# Patient Record
Sex: MALE | Race: WHITE | NOT HISPANIC OR LATINO | Employment: FULL TIME | URBAN - METROPOLITAN AREA
[De-identification: names, ages, dates, MRNs, and addresses within clinical notes are randomized per-mention and may not be internally consistent; named-entity substitution may affect disease eponyms.]

---

## 2024-04-12 ENCOUNTER — HOSPITAL ENCOUNTER (EMERGENCY)
Facility: CLINIC | Age: 39
Discharge: HOME | End: 2024-04-12
Attending: PHYSICIAN ASSISTANT | Admitting: PHYSICIAN ASSISTANT
Payer: COMMERCIAL

## 2024-04-12 ENCOUNTER — OFFICE VISIT (OUTPATIENT)
Dept: URGENT CARE | Facility: URGENT CARE | Age: 39
End: 2024-04-12
Payer: COMMERCIAL

## 2024-04-12 VITALS
HEIGHT: 72 IN | OXYGEN SATURATION: 100 % | WEIGHT: 162.92 LBS | TEMPERATURE: 97.3 F | DIASTOLIC BLOOD PRESSURE: 87 MMHG | BODY MASS INDEX: 22.07 KG/M2 | HEART RATE: 65 BPM | SYSTOLIC BLOOD PRESSURE: 137 MMHG | RESPIRATION RATE: 16 BRPM

## 2024-04-12 VITALS
DIASTOLIC BLOOD PRESSURE: 76 MMHG | RESPIRATION RATE: 18 BRPM | HEART RATE: 59 BPM | SYSTOLIC BLOOD PRESSURE: 121 MMHG | TEMPERATURE: 97.4 F | OXYGEN SATURATION: 99 %

## 2024-04-12 DIAGNOSIS — S61.310A LACERATION OF RIGHT INDEX FINGER W/O FOREIGN BODY WITH DAMAGE TO NAIL, INITIAL ENCOUNTER: Primary | ICD-10-CM

## 2024-04-12 DIAGNOSIS — S61.210A LACERATION OF RIGHT INDEX FINGER WITHOUT FOREIGN BODY WITHOUT DAMAGE TO NAIL, INITIAL ENCOUNTER: ICD-10-CM

## 2024-04-12 DIAGNOSIS — S05.01XA ABRASION OF RIGHT CORNEA, INITIAL ENCOUNTER: ICD-10-CM

## 2024-04-12 PROCEDURE — 99207 PR NO CHARGE LOS: CPT | Performed by: FAMILY MEDICINE

## 2024-04-12 PROCEDURE — 12001 RPR S/N/AX/GEN/TRNK 2.5CM/<: CPT

## 2024-04-12 PROCEDURE — 99283 EMERGENCY DEPT VISIT LOW MDM: CPT | Mod: 25

## 2024-04-12 PROCEDURE — 250N000009 HC RX 250

## 2024-04-12 PROCEDURE — 90715 TDAP VACCINE 7 YRS/> IM: CPT | Performed by: PHYSICIAN ASSISTANT

## 2024-04-12 PROCEDURE — 90471 IMMUNIZATION ADMIN: CPT | Performed by: PHYSICIAN ASSISTANT

## 2024-04-12 PROCEDURE — 250N000011 HC RX IP 250 OP 636: Performed by: PHYSICIAN ASSISTANT

## 2024-04-12 RX ORDER — TETRACAINE HYDROCHLORIDE 5 MG/ML
SOLUTION OPHTHALMIC
Status: COMPLETED
Start: 2024-04-12 | End: 2024-04-12

## 2024-04-12 RX ORDER — DULOXETIN HYDROCHLORIDE 30 MG/1
30 CAPSULE, DELAYED RELEASE ORAL DAILY
COMMUNITY
Start: 2024-02-15

## 2024-04-12 RX ORDER — BUPROPION HYDROCHLORIDE 150 MG/1
150 TABLET ORAL EVERY MORNING
COMMUNITY
Start: 2024-02-15

## 2024-04-12 RX ORDER — ERYTHROMYCIN 5 MG/G
0.5 OINTMENT OPHTHALMIC 4 TIMES DAILY
Qty: 1 G | Refills: 0 | Status: SHIPPED | OUTPATIENT
Start: 2024-04-12 | End: 2024-04-17

## 2024-04-12 RX ADMIN — CLOSTRIDIUM TETANI TOXOID ANTIGEN (FORMALDEHYDE INACTIVATED), CORYNEBACTERIUM DIPHTHERIAE TOXOID ANTIGEN (FORMALDEHYDE INACTIVATED), BORDETELLA PERTUSSIS TOXOID ANTIGEN (GLUTARALDEHYDE INACTIVATED), BORDETELLA PERTUSSIS FILAMENTOUS HEMAGGLUTININ ANTIGEN (FORMALDEHYDE INACTIVATED), BORDETELLA PERTUSSIS PERTACTIN ANTIGEN, AND BORDETELLA PERTUSSIS FIMBRIAE 2/3 ANTIGEN 0.5 ML: 5; 2; 2.5; 5; 3; 5 INJECTION, SUSPENSION INTRAMUSCULAR at 16:00

## 2024-04-12 RX ADMIN — FLUORESCEIN SODIUM: 1 STRIP OPHTHALMIC at 16:04

## 2024-04-12 RX ADMIN — TETRACAINE HYDROCHLORIDE: 5 SOLUTION OPHTHALMIC at 16:05

## 2024-04-12 ASSESSMENT — VISUAL ACUITY
OU: 20/15;WITHOUT CORRECTIVE LENSES
OS: WITHOUT CORRECTIVE LENSES;20/20
OU: 1
OD: 20/15;WITHOUT CORRECTIVE LENSES

## 2024-04-12 ASSESSMENT — COLUMBIA-SUICIDE SEVERITY RATING SCALE - C-SSRS
1. IN THE PAST MONTH, HAVE YOU WISHED YOU WERE DEAD OR WISHED YOU COULD GO TO SLEEP AND NOT WAKE UP?: NO
2. HAVE YOU ACTUALLY HAD ANY THOUGHTS OF KILLING YOURSELF IN THE PAST MONTH?: NO
6. HAVE YOU EVER DONE ANYTHING, STARTED TO DO ANYTHING, OR PREPARED TO DO ANYTHING TO END YOUR LIFE?: NO

## 2024-04-12 ASSESSMENT — ACTIVITIES OF DAILY LIVING (ADL): ADLS_ACUITY_SCORE: 35

## 2024-04-12 NOTE — PROGRESS NOTES
THIS IS A TRIAGE ENCOUNTER     Nils Miller is a 38 year old right-handed male who presents to the clinic with a laceration on the volar surface of the DIP joint region of the right index finger sustained today about 30 minutes ago.  This is a non work-related injury.   Mechanism of injury: a razor blade accidentally accidentally cut the patient's right index finger while the patient was cutting a zip tie.  Patient notes numbness at the distal right index finger.  .  .    Last tetanus booster within 10 years: yes.  Last booster was given on January 22, 2016.       Patient has a laceration near the volar aspect of the DIP joint.  Unsure about depth or joint capsule damage.  Patient will go to the [facility] emergency room for further evaluation.  I gave report to the emergency room nurse today at around 1:55 pm.  I told the patient that I would not charge for this triage evaluation.     Gerber Fleming MD

## 2024-04-12 NOTE — ED NOTES
Emergency Department Technician Wound Irrigation Note:    4/12/2024    3:36 PM      Wound location:  Right pointer finger    Irrigation Fluid: Normal Saline    Estimated Irrigation Volume (60 mL fluid per cm): 800    Carol Power

## 2024-04-12 NOTE — ED NOTES
Pt. Wound dressed subsequent to stitches with bacitracin, gauze and tape. Finger splint applied and held in place with tape. Pt. Given extra supplies for dressing changes, verbalized understanding. No further needs at this time, pt. Resting comfortably.

## 2024-04-12 NOTE — ED PROVIDER NOTES
History     Chief Complaint:  Laceration   Right eye FB    HPI   Nils Miller is a 38 year old male presents for his main concern of right pointer finger laceration.  He reports he has some distal numbness on the radial aspect of his distal finger which is distal from the laceration.  He reports he excellently cut himself with razor blade today.  He is right-hand dominant.  He originally presented to an urgent care and they did not feel comfortable suturing the wound.  He is not currently bleeding.    Patient requests to discuss a secondary concern.  He said a few days ago he was working and he felt like he got a little dust or may be piece of wood or something in his right eye.  Not a metallic foreign body.  He reports he has had foreign bodies in his eyes in the past and it usually gives him severe pain and headache but he does not have any symptoms.  He just reports a little bit discomfort.  He does not wear contacts or glasses.  He denies any difficulty with his vision at this time or significant pain.  He would just like me to take a look in his eye to ensure there is nothing embedded.  Denies any drainage or redness.    Independent Historian:        Review of External Notes:        Medications:    erythromycin (ROMYCIN) 5 MG/GM ophthalmic ointment  buPROPion (WELLBUTRIN XL) 150 MG 24 hr tablet  DULoxetine (CYMBALTA) 30 MG capsule  omeprazole (PRILOSEC) 20 MG DR capsule        Past Medical History:    Generalized anxiety disorder    Past Surgical History:    No past surgical history       Physical Exam   Patient Vitals for the past 24 hrs:   BP Temp Pulse Resp SpO2 Height Weight   04/12/24 1425 137/87 97.3  F (36.3  C) 65 16 100 % 1.829 m (6') 73.9 kg (162 lb 14.7 oz)      Visual Acuity-Left: without corrective lenses;20/20 Visual Acuity-Right: 20/15;without corrective lenses Visual Acuity-Bilateral: 20/15;without corrective lenses     Physical Exam  Eyes:      General: Lids are normal. Lids are  everted, no foreign bodies appreciated. Vision grossly intact. Gaze aligned appropriately.         Right eye: No foreign body, discharge or hordeolum.         Left eye: No foreign body, discharge or hordeolum.      Extraocular Movements: Extraocular movements intact.      Right eye: Normal extraocular motion.      Left eye: Normal extraocular motion.      Conjunctiva/sclera:      Right eye: Right conjunctiva is not injected. No chemosis, exudate or hemorrhage.     Left eye: Left conjunctiva is not injected. No chemosis, exudate or hemorrhage.     Pupils: Pupils are equal, round, and reactive to light.      Right eye: Corneal abrasion and fluorescein uptake present.      Slit lamp exam:     Right eye: No corneal flare, corneal ulcer, foreign body, hyphema, anterior chamber flares or photophobia.     Musculoskeletal:        Hands:          General:Vitals signs reviewed  Psych: Normal Affect  Lungs: Non labored breathing  Skin:1.5 Centimeter laceration to the volar surface of the right index finger just distal to the PIP joint.  Neuro: Normal mentation, decreased sensation to the distal right finger pad of the right radial aspect of the right pointer finger.  Sensation normal proximal to the laceration and normal distally on the ulnar aspect of the distal finger.  Normal strength of strength.  Vascular: Cap refill < 2 sec distal to injury,   Musculoskeletal:    Right pointer finger: Laceration as noted above.  Normal range of motion of the DIP PIP joints of the right pointer finger.  No swelling or bruising.  No bony tenderness.  No violation or injury to the fingernail.        Emergency Department Course     Imaging:  No orders to display       Laboratory:  Labs Ordered and Resulted from Time of ED Arrival to Time of ED Departure - No data to display     Procedures       Laceration Repair      Procedure: Laceration Repair    Indication: Laceration    Consent: Verbal    Location: Right R second (index)  finger    Length: 1.5 cm    Preparation: Irrigation with Sterile Saline.    Anesthesia/Sedation: Digital Block: A digital block was performed with Digital Block: A digital block was performed with Bupivacaine - 0.5% on the affected digit. on the affected digit.      Treatment/Exploration: Wound explored, no foreign bodies found     Closure: The wound was closed with one layer. Skin/superficial layer was closed with 3 x 4-0 Nylon using Interrupted sutures.     Patient Status: The patient tolerated the procedure well: Yes. There were no complications.       Splint Placement     Procedure: Splint Placement     Indication: Wound    Consent: Verbal     Location: Right R second (index) finger    Preparation: Wounds were cleansed and dressed with a non-adherent bandage     Procedure detail:   Splint was applied by Tech/Nurse with my assistance  Splint type: Finger splint  Splint materilal: Metal/Foam  After placement I checked and adjusted the fit as needed to ensure proper positioning/fit   Sensation and circulation are intact after splint placement     Patient Status: The patient tolerated the procedure well: Yes. There were no complications.        Emergency Department Course & Assessments:    Interventions:  Medications   tetracaine (PONTOCAINE) 0.5 % ophthalmic solution (  $Given by Other 4/12/24 1605)   fluorescein (FUL-MEJIA) 1 MG ophthalmic strip (  $Given by Other 4/12/24 1604)   Tdap (tetanus-diphtheria-acell pertussis) (ADACEL) injection 0.5 mL (0.5 mLs Intramuscular $Given 4/12/24 1600)        Independent Interpretation (X-rays, CTs, rhythm strip):      Consultations/Discussion of Management or Tests:         Social Determinants of Health affecting care:  Stress/Adjustment Disorders     Disposition:  The patient was discharged.    Impression & Plan    CMS Diagnoses: None       Medical Decision Making:    This is a very pleasant 38 year old male who presented with a laceration to the right finger.  The wound was  carefully evaluated and explored.  The laceration was closed with suture as noted herein.  There is no evidence of muscular, tendon, or bony damage with this laceration. Noted decreased sensation as noted above. I offered follow up with orthopedic hand, which was declined by the patient. Possible complications (infection, scarring) were reviewed with the patient.  Follow up with primary care will be indicated for suture removal in 7 days.  I advised strict return precautions for pain, redness or drainage to the site, bleeding, or any other concerning symptoms.    Nils Miller is a 38 year old male who presents for evaluation of a right eye irritation.  A broad differential diagnosis was considered including bacterial conjunctivitis, viral conjunctivitis, foreign body, corneal abrasion, chemical vs allergic conjunctivitis, corneal ulcer, HSV, herpes zoster opthalmicus, endopthalmitis, orbital cellulitis, etc.  Signs and symptoms consistent with a right corneal abrasion. Will start antibiotics and have close follow-up of eye physician if not imporving.  No red flag symptoms to suggest any of the above worrisome etiologies.      Patient is NOT contact lens wearer.        Diagnosis:    ICD-10-CM    1. Laceration of right index finger without foreign body without damage to nail, initial encounter  S61.210A       2. Abrasion of right cornea, initial encounter  S05.01XA            Discharge Medications:  Discharge Medication List as of 4/12/2024  4:23 PM        START taking these medications    Details   erythromycin (ROMYCIN) 5 MG/GM ophthalmic ointment Place 0.5 inches into the right eye 4 times daily for 5 daysDisp-1 g, R-0Local Print                4/12/2024   Luiz Ferro PA-C Kruger, Jacob C, PA-C  04/12/24 1945       Luiz Ferro PA-C  04/13/24 0906

## 2024-04-12 NOTE — ED TRIAGE NOTES
Patient referred here from Urgent Care for a laceration to his right pointer finger, likely needs stitches. Tdap up to date. Bleeding controlled in triage. Numbness to left side of finger.

## (undated) RX ORDER — BUPIVACAINE HYDROCHLORIDE 5 MG/ML
INJECTION, SOLUTION EPIDURAL; INTRACAUDAL
Status: DISPENSED
Start: 2024-04-12